# Patient Record
Sex: FEMALE
[De-identification: names, ages, dates, MRNs, and addresses within clinical notes are randomized per-mention and may not be internally consistent; named-entity substitution may affect disease eponyms.]

---

## 2021-05-25 ENCOUNTER — TRANSCRIPTION ENCOUNTER (OUTPATIENT)
Age: 31
End: 2021-05-25

## 2021-08-07 ENCOUNTER — TRANSCRIPTION ENCOUNTER (OUTPATIENT)
Age: 31
End: 2021-08-07

## 2021-12-27 ENCOUNTER — TRANSCRIPTION ENCOUNTER (OUTPATIENT)
Age: 31
End: 2021-12-27

## 2022-01-14 ENCOUNTER — TRANSCRIPTION ENCOUNTER (OUTPATIENT)
Age: 32
End: 2022-01-14

## 2022-03-03 PROBLEM — Z00.00 ENCOUNTER FOR PREVENTIVE HEALTH EXAMINATION: Status: ACTIVE | Noted: 2022-03-03

## 2022-03-08 ENCOUNTER — NON-APPOINTMENT (OUTPATIENT)
Age: 32
End: 2022-03-08

## 2022-03-09 ENCOUNTER — NON-APPOINTMENT (OUTPATIENT)
Age: 32
End: 2022-03-09

## 2022-03-11 ENCOUNTER — LABORATORY RESULT (OUTPATIENT)
Age: 32
End: 2022-03-11

## 2022-03-11 ENCOUNTER — APPOINTMENT (OUTPATIENT)
Dept: RHEUMATOLOGY | Facility: CLINIC | Age: 32
End: 2022-03-11
Payer: COMMERCIAL

## 2022-03-11 VITALS
HEART RATE: 94 BPM | OXYGEN SATURATION: 100 % | DIASTOLIC BLOOD PRESSURE: 83 MMHG | TEMPERATURE: 97.9 F | HEIGHT: 60 IN | SYSTOLIC BLOOD PRESSURE: 122 MMHG | BODY MASS INDEX: 23.58 KG/M2 | WEIGHT: 120.13 LBS

## 2022-03-11 DIAGNOSIS — M25.432 EFFUSION, LEFT WRIST: ICD-10-CM

## 2022-03-11 DIAGNOSIS — Z78.9 OTHER SPECIFIED HEALTH STATUS: ICD-10-CM

## 2022-03-11 DIAGNOSIS — Z82.49 FAMILY HISTORY OF ISCHEMIC HEART DISEASE AND OTHER DISEASES OF THE CIRCULATORY SYSTEM: ICD-10-CM

## 2022-03-11 PROCEDURE — 99204 OFFICE O/P NEW MOD 45 MIN: CPT | Mod: 25

## 2022-03-11 PROCEDURE — 36415 COLL VENOUS BLD VENIPUNCTURE: CPT

## 2022-03-11 RX ORDER — NAPROXEN 500 MG/1
TABLET ORAL
Refills: 0 | Status: ACTIVE | COMMUNITY

## 2022-03-11 RX ORDER — LORATADINE 5 MG/5 ML
SOLUTION, ORAL ORAL
Refills: 0 | Status: ACTIVE | COMMUNITY

## 2022-03-11 RX ORDER — LEVONORGESTREL AND ETHINYL ESTRADIOL 0.1-0.02MG
KIT ORAL
Refills: 0 | Status: ACTIVE | COMMUNITY

## 2022-03-11 RX ORDER — PSEUDOEPHEDRINE HCL 30 MG
TABLET ORAL
Refills: 0 | Status: ACTIVE | COMMUNITY

## 2022-03-13 LAB
CRP SERPL-MCNC: 18 MG/L
ERYTHROCYTE [SEDIMENTATION RATE] IN BLOOD BY WESTERGREN METHOD: 69 MM/HR
RHEUMATOID FACT SER QL: <10 IU/ML

## 2022-03-13 NOTE — HISTORY OF PRESENT ILLNESS
[FreeTextEntry1] : 31 year old woman with history of joint pain\par Joint pains initially started around 17 years of age\par Saw multiple doctors at that time, was unclear if arthritis vs fibromyalgia at that time\par Few years ago, had joint pains, initially diagnosed with fibromyalgia\par \par Tried different medications, gabapentin, SNRIs, NSAIDs\par \par Felt improvement with prednisone and NSAIDs in past\par Diagnosed with seronegative RA, prescribed methotrexate, but did not start at that time given concern about side effects and diagnosis\par Couple of years later, saw another rheumatologist,\par but did not follow up at that time\par \par At this time, for the past few months has been having pain and stiffness\par Feeling pain around joints, hands, hips, and feet\par Fingers will feel swollen, will wake up at night from pain in the legs\par Pain is worse after sitting and in the morning\par \par Usually takes naproxen which helps, about 2-3 per day\par Usually comes in flares, which can last days to weeks\par \par No family history of arthritis \par No personal or family history of psoriasis\par \par Sometimes takes T12 and MVI, no other vitamin or supplement\par \par No surgical history \par \par Medications in past:\par prednisone\par sulindac\par methotrexate\par Savella\par duloxetine\par tizanidine\par naproxen\par opioids\par Only things that helped were NSAIDs and steroids\par \par Last prednisone long time ago, about 7 years ago which helped\par Had steroid injections, fingers which helped but temporarily\par \par Last blood tests in February 2022, reviewed on patient's phone\par Had xray in past which were normal \par Also had left wrist swelling for the pat couple of months with hyperpigmentation over the flexor surface of the wrist, pain with lateral movements, no pain at present with flexion and extension\par No known injury to the wrist, patient is right hand dominant, had xray with PMD (not available to me at this time), unrevealing but ESR and CRP elevated \par \par No rashes\par no recent infections\par No bowel issues, no diarrhea, no preceding infections\par No uveitis

## 2022-03-13 NOTE — ASSESSMENT
[FreeTextEntry1] : 31 year old woman with history of inflammatory arthritis, noted to have left wrist pain and swelling with elevated ESR and CRP.  Patient previously with diagnosis of seronegative RA, prescribed methotrexate but did not start at that time.  At this time, will obtain ultrasound of the left wrist and hand to evaluate for active synovitis, will also repeat ESR and CRP, in addition to ALLY with reflex to serologies if positive, RF, CCP, lyme testing and HLA B27. WIll continue NSAIDs for now, will follow up in two weeks to review results and further management pending results.

## 2022-03-16 ENCOUNTER — TRANSCRIPTION ENCOUNTER (OUTPATIENT)
Age: 32
End: 2022-03-16

## 2022-03-17 ENCOUNTER — RESULT REVIEW (OUTPATIENT)
Age: 32
End: 2022-03-17

## 2022-03-17 ENCOUNTER — OUTPATIENT (OUTPATIENT)
Dept: OUTPATIENT SERVICES | Facility: HOSPITAL | Age: 32
LOS: 1 days | End: 2022-03-17

## 2022-03-17 ENCOUNTER — APPOINTMENT (OUTPATIENT)
Dept: ULTRASOUND IMAGING | Facility: CLINIC | Age: 32
End: 2022-03-17
Payer: COMMERCIAL

## 2022-03-17 PROCEDURE — 76882 US LMTD JT/FCL EVL NVASC XTR: CPT | Mod: 26,LT,76

## 2022-03-25 LAB
ANA PAT FLD IF-IMP: ABNORMAL
ANACR T: ABNORMAL
CCP AB SER IA-ACNC: <8 UNITS
HLA-B27 RELATED AG QL: NEGATIVE
RF+CCP IGG SER-IMP: NEGATIVE

## 2022-04-05 ENCOUNTER — APPOINTMENT (OUTPATIENT)
Dept: RHEUMATOLOGY | Facility: CLINIC | Age: 32
End: 2022-04-05
Payer: COMMERCIAL

## 2022-04-05 VITALS
HEIGHT: 60 IN | HEART RATE: 84 BPM | WEIGHT: 122 LBS | TEMPERATURE: 98.1 F | SYSTOLIC BLOOD PRESSURE: 103 MMHG | BODY MASS INDEX: 23.95 KG/M2 | OXYGEN SATURATION: 97 % | DIASTOLIC BLOOD PRESSURE: 69 MMHG

## 2022-04-05 DIAGNOSIS — M25.532 PAIN IN LEFT WRIST: ICD-10-CM

## 2022-04-05 PROCEDURE — 99214 OFFICE O/P EST MOD 30 MIN: CPT

## 2022-04-05 NOTE — PHYSICAL EXAM
[General Appearance - Alert] : alert [General Appearance - In No Acute Distress] : in no acute distress [General Appearance - Well-Appearing] : healthy appearing [Sclera] : the sclera and conjunctiva were normal [Respiration, Rhythm And Depth] : normal respiratory rhythm and effort [Exaggerated Use Of Accessory Muscles For Inspiration] : no accessory muscle use [Abnormal Walk] : normal gait [] : no rash [Oriented To Time, Place, And Person] : oriented to person, place, and time [Impaired Insight] : insight and judgment were intact [Affect] : the affect was normal [FreeTextEntry1] : left wrist edema with hyperpigmentation improving.  Improved range of motion with decreased tenderness

## 2022-04-05 NOTE — HISTORY OF PRESENT ILLNESS
[FreeTextEntry1] : 31 year old woman with history of joint pain\par Joint pains initially started around 17 years of age\par Saw multiple doctors at that time, was unclear if arthritis vs fibromyalgia at that time\par Few years ago, had joint pains, initially diagnosed with fibromyalgia\par \par Tried different medications, gabapentin, SNRIs, NSAIDs\par \par Felt improvement with prednisone and NSAIDs in past\par Diagnosed with seronegative RA, prescribed methotrexate, but did not start at that time given concern about side effects and diagnosis\par Couple of years later, saw another rheumatologist,\par but did not follow up at that time\par \par At this time, for the past few months has been having pain and stiffness\par Feeling pain around joints, hands, hips, and feet\par Fingers will feel swollen, will wake up at night from pain in the legs\par Pain is worse after sitting and in the morning\par \par Usually takes naproxen which helps, about 2-3 per day\par Usually comes in flares, which can last days to weeks\par \par No family history of arthritis \par No personal or family history of psoriasis\par \par Sometimes takes T12 and MVI, no other vitamin or supplement\par \par No surgical history \par \par Medications in past:\par prednisone\par sulindac\par methotrexate\par Savella\par duloxetine\par tizanidine\par naproxen\par opioids\par Only things that helped were NSAIDs and steroids\par \par Last prednisone long time ago, about 7 years ago which helped\par Had steroid injections, fingers which helped but temporarily\par \par Last blood tests in February 2022, reviewed on patient's phone\par Had xray in past which were normal \par Also had left wrist swelling for the pat couple of months with hyperpigmentation over the flexor surface of the wrist, pain with lateral movements, no pain at present with flexion and extension\par No known injury to the wrist, patient is right hand dominant, had xray with PMD (not available to me at this time), unrevealing but ESR and CRP elevated \par \par No rashes\par no recent infections\par No bowel issues, no diarrhea, no preceding infections\par No uveitis\par \par April 5, 2022\par Patient returns for follow up\par Reviewed lab results, ESR and CRP elevated, with RF and CCP negative\par Reviewed ultrasound results\par Left wrist with some improvement, feeling better, taking naproxen once per day about three days per week at this time\par Discussed treatment options, has not taken plaquenil in past\par Discussed trial of plaquenil\par

## 2022-04-05 NOTE — ASSESSMENT
[FreeTextEntry1] : 31 year old woman with history of inflammatory arthritis, noted to have left wrist pain and swelling with elevated ESR and CRP.  Patient previously with diagnosis of seronegative RA, reviewed of albs with ESR and CRP elevated, ALLY positive with negative serologies and RF, CCP and HLA B27 negative at this time,  Discussed trial of plaquenil, will start 200 mg qday increasing to bid as tolerated.  Discussed need for follow up with ophthalmologist as recommended for retinal monitoring given plaquenil use.  Can continue naproxen as needed for pain and swelling for now as well.  follow up in six weeks or sooner as needed.

## 2022-04-05 NOTE — REVIEW OF SYSTEMS
[Joint Pain] : joint pain [Joint Swelling] : joint swelling [Negative] : Heme/Lymph [FreeTextEntry9] : improving [de-identified] : dry skin patches intermittently, not at present

## 2022-05-17 ENCOUNTER — APPOINTMENT (OUTPATIENT)
Dept: RHEUMATOLOGY | Facility: CLINIC | Age: 32
End: 2022-05-17
Payer: COMMERCIAL

## 2022-05-17 VITALS
HEART RATE: 100 BPM | BODY MASS INDEX: 24.74 KG/M2 | HEIGHT: 60 IN | SYSTOLIC BLOOD PRESSURE: 115 MMHG | WEIGHT: 126 LBS | TEMPERATURE: 98 F | DIASTOLIC BLOOD PRESSURE: 80 MMHG | OXYGEN SATURATION: 98 %

## 2022-05-17 PROCEDURE — 99214 OFFICE O/P EST MOD 30 MIN: CPT | Mod: 25

## 2022-05-17 PROCEDURE — 36415 COLL VENOUS BLD VENIPUNCTURE: CPT

## 2022-05-17 NOTE — PHYSICAL EXAM
[General Appearance - Alert] : alert [General Appearance - In No Acute Distress] : in no acute distress [General Appearance - Well-Appearing] : healthy appearing [Sclera] : the sclera and conjunctiva were normal [Respiration, Rhythm And Depth] : normal respiratory rhythm and effort [Exaggerated Use Of Accessory Muscles For Inspiration] : no accessory muscle use [Edema] : there was no peripheral edema [Abnormal Walk] : normal gait [Nail Clubbing] : no clubbing  or cyanosis of the fingernails [Musculoskeletal - Swelling] : no joint swelling seen [Motor Tone] : muscle strength and tone were normal [] : no rash [Skin Lesions] : no skin lesions [Oriented To Time, Place, And Person] : oriented to person, place, and time [Impaired Insight] : insight and judgment were intact [Affect] : the affect was normal [Mood] : the mood was normal [FreeTextEntry1] : left wrist much improved, no synovitis noted with full ROM.  Discomfort with full flexion of the left 2nd MCP and PIP, no edema or effusion noted, no trigger

## 2022-05-17 NOTE — ASSESSMENT
[FreeTextEntry1] : 31 year old woman with history of inflammatory arthritis, noted to have left wrist pain and swelling with elevated ESR and CRP.  Patient previously with diagnosis of seronegative RA, reviewed of labs with ESR and CRP elevated, ALLY positive with negative serologies and RF, CCP and HLA B27 negative at this time.  Started on plaquenil 200 mg at last visit in April 2022, doing well, although given GI side effects, unable to tolerate bid dosing at this time.  Left wrist much improved, minimal left 2nd MCP and PIP stiffness.  Will continue plaquenil 200 mg qday for now, opthalmology consultation discussed, follow up with ophthalmologist as recommended for retinal monitoring.  Can continue naproxen as needed for pain and swelling for now as well.  Will update labs today in office, follow up in two months or sooner as needed.

## 2022-05-17 NOTE — DATA REVIEWED
[FreeTextEntry1] : cmp norm\par cbc norm\par \par a1c 5.4\par \par esr 76\par  20\par \par \par  US Joint Nonvasc Extremity Limited, Left             Final\par \par No Documents Attached\par \par \par Result Annotated 25Mar2022 09:54AM by MISHA OTT\par \par \par Appt 4/5/2022\par \par \par   EXAM:  US JOINT NONVASC EXT LTD LT\par EXAM:  US JOINT NONVASC EXT LTD LT\par \par PROCEDURE DATE:  03/17/2022\par \par \par \par \par INTERPRETATION:  History: Left wrist/hand swelling\par \par Technique: Ultrasound imaging of the left hand and wrist was performed utilizing grayscale and power Doppler techniques, in the region of the patient's pain.\par \par Comparison: None\par \par Findings:\par \par There is mild tendinosis with tenosynovitis of the flexor carpi radialis. Mild synovitis of the first carpometacarpal joint and radiocarpal interval. Extensor compartments of the wrist appear unremarkable.\par \par Median nerve appears normal.\par \par No evidence of synovitis, tenosynovitis, or joint effusion within the visualized portions of the hand.\par \par Impression:\par \par Mild tendinosis with tenosynovitis of the flexor carpi radialis.\par \par Mild synovitis of the first carpometacarpal joint and radiocarpal interval.\par \par --- End of Report ---\par \par \par \par \par \par \par JANELL CASAREZ M.D., ATTENDING RADIOLOGIST\par This document has been electronically signed. Mar 17 2022  4:41PM\par \par  \par \par  Ordered by: IMSHA OTT       Collected/Examined: 17Mar2022 03:41PM       \par Verified by: MISHA OTT 25Mar2022 09:55AM       \par  Result Communication: No patient communication needed at this time;\par Stage: Final       \par  Performed at: [facility]       Resulted: 17Mar2022 03:39PM       Last Updated: 25Mar2022 09:55AM       Accession: [id number]

## 2022-05-17 NOTE — HISTORY OF PRESENT ILLNESS
[FreeTextEntry1] : 31 year old woman with history of joint pain\par Joint pains initially started around 17 years of age\par Saw multiple doctors at that time, was unclear if arthritis vs fibromyalgia at that time\par Few years ago, had joint pains, initially diagnosed with fibromyalgia\par \par Tried different medications, gabapentin, SNRIs, NSAIDs\par \par Felt improvement with prednisone and NSAIDs in past\par Diagnosed with seronegative RA, prescribed methotrexate, but did not start at that time given concern about side effects and diagnosis\par Couple of years later, saw another rheumatologist,\par but did not follow up at that time\par \par At this time, for the past few months has been having pain and stiffness\par Feeling pain around joints, hands, hips, and feet\par Fingers will feel swollen, will wake up at night from pain in the legs\par Pain is worse after sitting and in the morning\par \par Usually takes naproxen which helps, about 2-3 per day\par Usually comes in flares, which can last days to weeks\par \par No family history of arthritis \par No personal or family history of psoriasis\par \par Sometimes takes T12 and MVI, no other vitamin or supplement\par \par No surgical history \par \par Medications in past:\par prednisone\par sulindac\par methotrexate\par Savella\par duloxetine\par tizanidine\par naproxen\par opioids\par Only things that helped were NSAIDs and steroids\par \par Last prednisone long time ago, about 7 years ago which helped\par Had steroid injections, fingers which helped but temporarily\par \par Last blood tests in February 2022, reviewed on patient's phone\par Had xray in past which were normal \par Also had left wrist swelling for the pat couple of months with hyperpigmentation over the flexor surface of the wrist, pain with lateral movements, no pain at present with flexion and extension\par No known injury to the wrist, patient is right hand dominant, had xray with PMD (not available to me at this time), unrevealing but ESR and CRP elevated \par \par No rashes\par no recent infections\par No bowel issues, no diarrhea, no preceding infections\par No uveitis\par \par April 5, 2022\par Patient returns for follow up\par Reviewed lab results, ESR and CRP elevated, with RF and CCP negative\par Reviewed ultrasound results\par Left wrist with some improvement, feeling better, taking naproxen once per day about three days per week at this time\par Discussed treatment options, has not taken plaquenil in past\par Discussed trial of plaquenil\par \par May 17, 2022\par Patient returns for follow up\par Feels left wrist much improved, left 2nd finger with stiffness in the morning in flexion\par No pain at present\par Taking plaquenil 200 mg once per day due to GI side effects, takes at night, feeling as though getting better\par No NSAIDs for pain\par No other joints involved at this time.

## 2022-05-18 LAB
ALBUMIN SERPL ELPH-MCNC: 4.4 G/DL
ALP BLD-CCNC: 70 U/L
ALT SERPL-CCNC: 9 U/L
ANION GAP SERPL CALC-SCNC: 15 MMOL/L
AST SERPL-CCNC: 16 U/L
BASOPHILS # BLD AUTO: 0.06 K/UL
BASOPHILS NFR BLD AUTO: 0.9 %
BILIRUB SERPL-MCNC: 0.3 MG/DL
BUN SERPL-MCNC: 8 MG/DL
CALCIUM SERPL-MCNC: 9.7 MG/DL
CHLORIDE SERPL-SCNC: 103 MMOL/L
CO2 SERPL-SCNC: 22 MMOL/L
CREAT SERPL-MCNC: 0.63 MG/DL
CRP SERPL-MCNC: 29 MG/L
EGFR: 123 ML/MIN/1.73M2
EOSINOPHIL # BLD AUTO: 0.2 K/UL
EOSINOPHIL NFR BLD AUTO: 3.1 %
ERYTHROCYTE [SEDIMENTATION RATE] IN BLOOD BY WESTERGREN METHOD: 44 MM/HR
GLUCOSE SERPL-MCNC: 77 MG/DL
HCT VFR BLD CALC: 45.4 %
HGB BLD-MCNC: 14.2 G/DL
IMM GRANULOCYTES NFR BLD AUTO: 0.3 %
LYMPHOCYTES # BLD AUTO: 2.07 K/UL
LYMPHOCYTES NFR BLD AUTO: 31.8 %
MAN DIFF?: NORMAL
MCHC RBC-ENTMCNC: 27.8 PG
MCHC RBC-ENTMCNC: 31.3 GM/DL
MCV RBC AUTO: 88.8 FL
MONOCYTES # BLD AUTO: 0.36 K/UL
MONOCYTES NFR BLD AUTO: 5.5 %
NEUTROPHILS # BLD AUTO: 3.8 K/UL
NEUTROPHILS NFR BLD AUTO: 58.4 %
PLATELET # BLD AUTO: 388 K/UL
POTASSIUM SERPL-SCNC: 4.6 MMOL/L
PROT SERPL-MCNC: 8 G/DL
RBC # BLD: 5.11 M/UL
RBC # FLD: 12.8 %
SODIUM SERPL-SCNC: 140 MMOL/L
WBC # FLD AUTO: 6.51 K/UL

## 2022-07-28 ENCOUNTER — APPOINTMENT (OUTPATIENT)
Dept: RHEUMATOLOGY | Facility: CLINIC | Age: 32
End: 2022-07-28

## 2022-07-28 VITALS
BODY MASS INDEX: 24.15 KG/M2 | SYSTOLIC BLOOD PRESSURE: 123 MMHG | OXYGEN SATURATION: 99 % | DIASTOLIC BLOOD PRESSURE: 88 MMHG | HEIGHT: 60 IN | HEART RATE: 92 BPM | WEIGHT: 123 LBS | TEMPERATURE: 97.3 F

## 2022-07-28 DIAGNOSIS — R70.0 ELEVATED ERYTHROCYTE SEDIMENTATION RATE: ICD-10-CM

## 2022-07-28 DIAGNOSIS — R79.82 ELEVATED C-REACTIVE PROTEIN (CRP): ICD-10-CM

## 2022-07-28 PROCEDURE — 36415 COLL VENOUS BLD VENIPUNCTURE: CPT

## 2022-07-28 PROCEDURE — 99214 OFFICE O/P EST MOD 30 MIN: CPT | Mod: 25

## 2022-07-28 NOTE — DATA REVIEWED
[FreeTextEntry1] : March 2022\par Rheumatoid factor negative\par CCP negative\par ALLY 1:160\par SSA SSB negative\par MARLIN negative\par Double-stranded DNA negative \par B 27 negative\par Lyme negative\par ESR 69\par CRP 18\par \par Hydroxychloroquine started April 2022\par Ophthalmology May 2022\par \par Labs May 2022 \par CBC normal\par CMP normal\par CRP 29 (previously 18)\par ESR 44 (previously 69 in March 2022)\par \par \par  US Joint Nonvasc Extremity Limited, Left             Final\par \par No Documents Attached\par \par \par Result Annotated 25Mar2022 09:54AM by MISHA OTT\par \par \par Appt 4/5/2022\par \par \par   EXAM:  US JOINT NONVASC EXT LTD LT\par EXAM:  US JOINT NONVASC EXT LTD LT\par \par PROCEDURE DATE:  03/17/2022\par \par \par \par \par INTERPRETATION:  History: Left wrist/hand swelling\par \par Technique: Ultrasound imaging of the left hand and wrist was performed utilizing grayscale and power Doppler techniques, in the region of the patient's pain.\par \par Comparison: None\par \par Findings:\par \par There is mild tendinosis with tenosynovitis of the flexor carpi radialis. Mild synovitis of the first carpometacarpal joint and radiocarpal interval. Extensor compartments of the wrist appear unremarkable.\par \par Median nerve appears normal.\par \par No evidence of synovitis, tenosynovitis, or joint effusion within the visualized portions of the hand.\par \par Impression:\par \par Mild tendinosis with tenosynovitis of the flexor carpi radialis.\par \par Mild synovitis of the first carpometacarpal joint and radiocarpal interval.\par \par --- End of Report ---\par \par \par \par \par \par \par JANELL CASAREZ M.D., ATTENDING RADIOLOGIST\par This document has been electronically signed. Mar 17 2022  4:41PM\par \par  \par \par  Ordered by: MISHA OTT       Collected/Examined: 17Mar2022 03:41PM       \par Verified by: MISHA OTT 25Mar2022 09:55AM       \par  Result Communication: No patient communication needed at this time;\par Stage: Final       \par  Performed at: [facility]       Resulted: 17Mar2022 03:39PM       Last Updated: 25Mar2022 09:55AM       Accession: [id number]

## 2022-07-28 NOTE — ASSESSMENT
[FreeTextEntry1] : 31 year old woman with history of inflammatory polyarthritis, noted to have left wrist pain and swelling with elevated ESR and CRP.  Patient previously with diagnosis of seronegative RA, reviewed of labs with ESR and CRP elevated, ALLY positive with negative serologies and RF, CCP and HLA B27 negative at this time.  Started on plaquenil 200 mg in April 2022, doing well, although given previous GI side effects, unable to tolerate bid dosing.  Joint symptoms continue to improve with minimal morning stiffness of the hands less than 15 minutes.   Will continue plaquenil 200 mg qday but given improvement in GI symptoms we will try to increase Plaquenil to 400 mg/day alternating with 200 mg/day for 2 to 3 months. Opthalmology consultation up-to-date last visit May 2022, continue to follow up with ophthalmologist as recommended for retinal monitoring.  Can continue naproxen as needed for pain and swelling for now as well.  Will update labs today in office, follow up in three months or sooner as needed.

## 2022-07-28 NOTE — HISTORY OF PRESENT ILLNESS
[FreeTextEntry1] : 31 year old woman with history of joint pain\par Joint pains initially started around 17 years of age\par Saw multiple doctors at that time, was unclear if arthritis vs fibromyalgia at that time\par Few years ago, had joint pains, initially diagnosed with fibromyalgia\par \par Tried different medications, gabapentin, SNRIs, NSAIDs\par \par Felt improvement with prednisone and NSAIDs in past\par Diagnosed with seronegative RA, prescribed methotrexate, but did not start at that time given concern about side effects and diagnosis\par Couple of years later, saw another rheumatologist,\par but did not follow up at that time\par \par At this time, for the past few months has been having pain and stiffness\par Feeling pain around joints, hands, hips, and feet\par Fingers will feel swollen, will wake up at night from pain in the legs\par Pain is worse after sitting and in the morning\par \par Usually takes naproxen which helps, about 2-3 per day\par Usually comes in flares, which can last days to weeks\par \par No family history of arthritis \par No personal or family history of psoriasis\par \par Sometimes takes T12 and MVI, no other vitamin or supplement\par \par No surgical history \par \par Medications in past:\par prednisone\par sulindac\par methotrexate\par Savella\par duloxetine\par tizanidine\par naproxen\par opioids\par Only things that helped were NSAIDs and steroids\par \par Last prednisone long time ago, about 7 years ago which helped\par Had steroid injections, fingers which helped but temporarily\par \par Last blood tests in February 2022, reviewed on patient's phone\par Had xray in past which were normal \par Also had left wrist swelling for the past couple of months with hyperpigmentation over the flexor surface of the wrist, pain with lateral movements, no pain at present with flexion and extension\par No known injury to the wrist, patient is right hand dominant, had xray with PMD (not available to me at this time), unrevealing but ESR and CRP elevated \par \par No rashes\par no recent infections\par No bowel issues, no diarrhea, no preceding infections\par No uveitis\par \par April 5, 2022\par Patient returns for follow up\par Reviewed lab results, ESR and CRP elevated, with RF and CCP negative\par Reviewed ultrasound results\par Left wrist with some improvement, feeling better, taking naproxen once per day about three days per week at this time\par Discussed treatment options, has not taken plaquenil in past\par Discussed trial of plaquenil\par \par May 17, 2022\par Patient returns for follow up\par Feels left wrist much improved, left 2nd finger with stiffness in the morning in flexion\par No pain at present\par Taking plaquenil 200 mg once per day due to GI side effects, takes at night, feeling as though getting better\par No NSAIDs for pain\par No other joints involved at this time.\par \par July 28, 2022\par Patient returns for follow up\par Patient feeling better, still with some stiffness in the morning about 10 to 15 minutes of the hands\par Left wrist much improved currently asymptomatic\par Reviewed pictures on phone from late June with swelling of the left second finger, improves as day progresses\par Able to tolerate Plaquenil 200 mg once per day, no GI symptoms today\par Ophthalmology follow-up May 26, 2022, recommended follow-up in 1 year\par No involvement of the lower extremities\par

## 2022-07-28 NOTE — PHYSICAL EXAM
[General Appearance - Alert] : alert [General Appearance - In No Acute Distress] : in no acute distress [General Appearance - Well-Appearing] : healthy appearing [Sclera] : the sclera and conjunctiva were normal [Respiration, Rhythm And Depth] : normal respiratory rhythm and effort [Edema] : there was no peripheral edema [Exaggerated Use Of Accessory Muscles For Inspiration] : no accessory muscle use [Abnormal Walk] : normal gait [Nail Clubbing] : no clubbing  or cyanosis of the fingernails [Musculoskeletal - Swelling] : no joint swelling seen [Motor Tone] : muscle strength and tone were normal [Skin Lesions] : no skin lesions [] : no rash [Oriented To Time, Place, And Person] : oriented to person, place, and time [Impaired Insight] : insight and judgment were intact [Affect] : the affect was normal [Mood] : the mood was normal [FreeTextEntry1] : No active synovitis, good handgrip strength bilaterally, improvement of flexion of the left second MCP and PIP

## 2022-07-29 LAB
ALBUMIN SERPL ELPH-MCNC: 4.6 G/DL
ALP BLD-CCNC: 64 U/L
ALT SERPL-CCNC: 8 U/L
ANION GAP SERPL CALC-SCNC: 12 MMOL/L
AST SERPL-CCNC: 14 U/L
BASOPHILS # BLD AUTO: 0.03 K/UL
BASOPHILS NFR BLD AUTO: 0.5 %
BILIRUB SERPL-MCNC: 0.5 MG/DL
BUN SERPL-MCNC: 9 MG/DL
CALCIUM SERPL-MCNC: 10 MG/DL
CHLORIDE SERPL-SCNC: 102 MMOL/L
CO2 SERPL-SCNC: 23 MMOL/L
CREAT SERPL-MCNC: 0.69 MG/DL
CRP SERPL-MCNC: 16 MG/L
EGFR: 120 ML/MIN/1.73M2
EOSINOPHIL # BLD AUTO: 0.12 K/UL
EOSINOPHIL NFR BLD AUTO: 2 %
ERYTHROCYTE [SEDIMENTATION RATE] IN BLOOD BY WESTERGREN METHOD: 48 MM/HR
GLUCOSE SERPL-MCNC: 76 MG/DL
HCT VFR BLD CALC: 42.6 %
HGB BLD-MCNC: 13.8 G/DL
IMM GRANULOCYTES NFR BLD AUTO: 0.3 %
LYMPHOCYTES # BLD AUTO: 1.66 K/UL
LYMPHOCYTES NFR BLD AUTO: 27.1 %
MAN DIFF?: NORMAL
MCHC RBC-ENTMCNC: 28.1 PG
MCHC RBC-ENTMCNC: 32.4 GM/DL
MCV RBC AUTO: 86.8 FL
MONOCYTES # BLD AUTO: 0.41 K/UL
MONOCYTES NFR BLD AUTO: 6.7 %
NEUTROPHILS # BLD AUTO: 3.88 K/UL
NEUTROPHILS NFR BLD AUTO: 63.4 %
PLATELET # BLD AUTO: 280 K/UL
POTASSIUM SERPL-SCNC: 4.6 MMOL/L
PROT SERPL-MCNC: 8 G/DL
RBC # BLD: 4.91 M/UL
RBC # FLD: 12.5 %
SODIUM SERPL-SCNC: 137 MMOL/L
WBC # FLD AUTO: 6.12 K/UL

## 2022-09-08 ENCOUNTER — APPOINTMENT (OUTPATIENT)
Dept: RHEUMATOLOGY | Facility: CLINIC | Age: 32
End: 2022-09-08

## 2022-09-08 VITALS
SYSTOLIC BLOOD PRESSURE: 114 MMHG | DIASTOLIC BLOOD PRESSURE: 77 MMHG | HEIGHT: 60 IN | TEMPERATURE: 97.3 F | WEIGHT: 117 LBS | BODY MASS INDEX: 22.97 KG/M2 | HEART RATE: 96 BPM | OXYGEN SATURATION: 98 %

## 2022-09-08 PROCEDURE — 99214 OFFICE O/P EST MOD 30 MIN: CPT

## 2022-09-08 NOTE — PHYSICAL EXAM
[General Appearance - Alert] : alert [General Appearance - In No Acute Distress] : in no acute distress [General Appearance - Well-Appearing] : healthy appearing [Sclera] : the sclera and conjunctiva were normal [Respiration, Rhythm And Depth] : normal respiratory rhythm and effort [Exaggerated Use Of Accessory Muscles For Inspiration] : no accessory muscle use [Edema] : there was no peripheral edema [Abnormal Walk] : normal gait [Nail Clubbing] : no clubbing  or cyanosis of the fingernails [Motor Tone] : muscle strength and tone were normal [] : no rash [Skin Lesions] : no skin lesions [Oriented To Time, Place, And Person] : oriented to person, place, and time [Impaired Insight] : insight and judgment were intact [Affect] : the affect was normal [Mood] : the mood was normal [FreeTextEntry1] : Minimal tenderness over the right CMC of the first digit of the right hand.  Minimal discomfort with flexion of the left second PIP.  No MCP synovitis noted.  No wrist synovitis noted.  Good range of motion of the elbows shoulders.  No involvement of the lower extremity at this time

## 2022-09-08 NOTE — HISTORY OF PRESENT ILLNESS
[FreeTextEntry1] : March 11, 2022\par New patient visit \par 31 year old woman with history of joint pain\par Joint pains initially started around 17 years of age\par Saw multiple doctors at that time, was unclear if arthritis vs fibromyalgia at that time\par Few years ago, had joint pains, initially diagnosed with fibromyalgia\par \par Tried different medications, gabapentin, SNRIs, NSAIDs\par \par Felt improvement with prednisone and NSAIDs in past\par Diagnosed with seronegative RA, prescribed methotrexate, but did not start at that time given concern about side effects and diagnosis\par Couple of years later, saw another rheumatologist,\par but did not follow up at that time\par \par At this time, for the past few months has been having pain and stiffness\par Feeling pain around joints, hands, hips, and feet\par Fingers will feel swollen, will wake up at night from pain in the legs\par Pain is worse after sitting and in the morning\par \par Usually takes naproxen which helps, about 2-3 per day\par Usually comes in flares, which can last days to weeks\par \par No family history of arthritis \par No personal or family history of psoriasis\par \par Sometimes takes T12 and MVI, no other vitamin or supplement\par \par No surgical history \par \par Medications in past:\par prednisone\par sulindac\par methotrexate\par Savella\par duloxetine\par tizanidine\par naproxen\par opioids\par Only things that helped were NSAIDs and steroids\par \par Last prednisone long time ago, about 7 years ago which helped\par Had steroid injections, fingers which helped but temporarily\par \par Last blood tests in February 2022, reviewed on patient's phone\par Had xray in past which were normal \par Also had left wrist swelling for the past couple of months with hyperpigmentation over the flexor surface of the wrist, pain with lateral movements, no pain at present with flexion and extension\par No known injury to the wrist, patient is right hand dominant, had xray with PMD (not available to me at this time), unrevealing but ESR and CRP elevated \par \par No rashes\par no recent infections\par No bowel issues, no diarrhea, no preceding infections\par No uveitis\par \par April 5, 2022\par Patient returns for follow up\par Reviewed lab results, ESR and CRP elevated, with RF and CCP negative\par Reviewed ultrasound results\par Left wrist with some improvement, feeling better, taking naproxen once per day about three days per week at this time\par Discussed treatment options, has not taken plaquenil in past\par Discussed trial of plaquenil\par \par May 17, 2022\par Patient returns for follow up\par Feels left wrist much improved, left 2nd finger with stiffness in the morning in flexion\par No pain at present\par Taking plaquenil 200 mg once per day due to GI side effects, takes at night, feeling as though getting better\par No NSAIDs for pain\par No other joints involved at this time.\par \par July 28, 2022\par Patient returns for follow up\par Patient feeling better, still with some stiffness in the morning about 10 to 15 minutes of the hands\par Left wrist much improved currently asymptomatic\par Reviewed pictures on phone from late June with swelling of the left second finger, improves as day progresses\par Able to tolerate Plaquenil 200 mg once per day, no GI symptoms today\par Ophthalmology follow-up May 26, 2022, recommended follow-up in 1 year\par No involvement of the lower extremities\par \par September 8, 2022\par Patient returns for follow-up\par Feeling well today, taking hydroxychloroquine 200 mg once per day\par Had COVID infection in early August 2022, was symptomatic for about 2 weeks, did not take hydroxychloroquine at that time\par Since restarting about 3 weeks ago, feels improvement in joint stiffness and pain\par Has pain in the right CMC of thumb, as well as left second finger\par No other joints involved at this time\par No side effects noted from hydroxychloroquine, although hesitant to increase to twice daily dosing\par No episodes of swelling of joints since last visit\par Some weight loss, intentional.\par Trying to increase exercise as well\par Ophthalmology up-to-date, last visit May 2022.\par No recent naproxen use, last used during COVID infection

## 2022-09-08 NOTE — ASSESSMENT
[FreeTextEntry1] : 32 year old woman with history of inflammatory polyarthritis, noted to have left wrist pain and swelling with elevated ESR and CRP.  Patient previously with diagnosis of seronegative RA, review of labs with ESR and CRP elevated, ALLY positive with negative serologies and RF, CCP and HLA B27 negative  Started on plaquenil 200 mg in April 2022, doing well, although given previous GI side effects, unable to tolerate bid dosing.  Joint symptoms continue to improve with minimal morning stiffness of the hands less than 15 minutes.  Patient recently restarted hydroxychloroquine about 3 weeks ago as did not take medication during recent COVID infection in early August 2022, now feeling improvement in joint symptoms.  Given recent COVID infection and recent restart of hydroxychloroquine, will repeat labs at next visit to reassess inflammatory markers in 2 months. Opthalmology consultation up-to-date last visit May 2022, continue to follow up with ophthalmologist as recommended for retinal monitoring.  Can continue naproxen as needed for pain and swelling for now as well.  Follow up in two months or sooner as needed.

## 2022-09-08 NOTE — REVIEW OF SYSTEMS
[Joint Stiffness] : joint stiffness [Negative] : Heme/Lymph [FreeTextEntry9] : right thumb and left second finger

## 2022-09-08 NOTE — DATA REVIEWED
[FreeTextEntry1] : March 2022\par Rheumatoid factor negative\par CCP negative\par ALLY 1:160\par SSA SSB negative\par MARLIN negative\par Double-stranded DNA negative \par B 27 negative\par Lyme negative\par ESR 69\par CRP 18\par \par Hydroxychloroquine started April 2022\par Ophthalmology May 2022\par \par Labs May 2022 \par CBC normal\par CMP normal\par CRP 29 (previously 18)\par ESR 44 (previously 69 in March 2022)\par \par \par  US Joint Nonvasc Extremity Limited, Left             Final\par \par No Documents Attached\par \par \par Result Annotated 25Mar2022 09:54AM by MISHA OTT\par \par \par Appt 4/5/2022\par \par \par   EXAM:  US JOINT NONVASC EXT LTD LT\par EXAM:  US JOINT NONVASC EXT LTD LT\par \par PROCEDURE DATE:  03/17/2022\par \par \par \par \par INTERPRETATION:  History: Left wrist/hand swelling\par \par Technique: Ultrasound imaging of the left hand and wrist was performed utilizing grayscale and power Doppler techniques, in the region of the patient's pain.\par \par Comparison: None\par \par Findings:\par \par There is mild tendinosis with tenosynovitis of the flexor carpi radialis. Mild synovitis of the first carpometacarpal joint and radiocarpal interval. Extensor compartments of the wrist appear unremarkable.\par \par Median nerve appears normal.\par \par No evidence of synovitis, tenosynovitis, or joint effusion within the visualized portions of the hand.\par \par Impression:\par \par Mild tendinosis with tenosynovitis of the flexor carpi radialis.\par \par Mild synovitis of the first carpometacarpal joint and radiocarpal interval.\par \par --- End of Report ---\par \par \par \par \par \par \par JANELL CASAREZ M.D., ATTENDING RADIOLOGIST\par This document has been electronically signed. Mar 17 2022  4:41PM\par \par  \par \par  Ordered by: MISHA OTT       Collected/Examined: 17Mar2022 03:41PM       \par Verified by: MISHA OTT 25Mar2022 09:55AM       \par  Result Communication: No patient communication needed at this time;\par Stage: Final       \par  Performed at: [facility]       Resulted: 17Mar2022 03:39PM       Last Updated: 25Mar2022 09:55AM       Accession: [id number]

## 2022-11-10 ENCOUNTER — APPOINTMENT (OUTPATIENT)
Dept: RHEUMATOLOGY | Facility: CLINIC | Age: 32
End: 2022-11-10

## 2022-11-10 VITALS
HEIGHT: 60 IN | SYSTOLIC BLOOD PRESSURE: 91 MMHG | TEMPERATURE: 98.2 F | WEIGHT: 115 LBS | OXYGEN SATURATION: 99 % | HEART RATE: 90 BPM | BODY MASS INDEX: 22.58 KG/M2 | DIASTOLIC BLOOD PRESSURE: 61 MMHG

## 2022-11-10 DIAGNOSIS — M06.4 INFLAMMATORY POLYARTHROPATHY: ICD-10-CM

## 2022-11-10 DIAGNOSIS — M25.50 PAIN IN UNSPECIFIED JOINT: ICD-10-CM

## 2022-11-10 DIAGNOSIS — M25.659 STIFFNESS OF UNSPECIFIED HIP, NOT ELSEWHERE CLASSIFIED: ICD-10-CM

## 2022-11-10 PROCEDURE — 36415 COLL VENOUS BLD VENIPUNCTURE: CPT

## 2022-11-10 PROCEDURE — 99214 OFFICE O/P EST MOD 30 MIN: CPT | Mod: 25

## 2022-11-10 RX ORDER — HYDROXYCHLOROQUINE SULFATE 200 MG/1
200 TABLET, FILM COATED ORAL TWICE DAILY
Qty: 80 | Refills: 1 | Status: ACTIVE | OUTPATIENT
Start: 2022-04-05

## 2022-11-10 NOTE — PHYSICAL EXAM
[General Appearance - Alert] : alert [General Appearance - In No Acute Distress] : in no acute distress [General Appearance - Well-Appearing] : healthy appearing [Sclera] : the sclera and conjunctiva were normal [Respiration, Rhythm And Depth] : normal respiratory rhythm and effort [Exaggerated Use Of Accessory Muscles For Inspiration] : no accessory muscle use [Edema] : there was no peripheral edema [No Spinal Tenderness] : no spinal tenderness [Abnormal Walk] : normal gait [Nail Clubbing] : no clubbing  or cyanosis of the fingernails [Musculoskeletal - Swelling] : no joint swelling seen [Motor Tone] : muscle strength and tone were normal [] : no rash [Skin Lesions] : no skin lesions [Oriented To Time, Place, And Person] : oriented to person, place, and time [Impaired Insight] : insight and judgment were intact [Affect] : the affect was normal [Mood] : the mood was normal [FreeTextEntry1] : Decreased external rotation of the left hip more than right hip

## 2022-11-10 NOTE — HISTORY OF PRESENT ILLNESS
[FreeTextEntry1] : March 11, 2022\par New patient visit \par 31 year old woman with history of joint pain\par Joint pains initially started around 17 years of age\par Saw multiple doctors at that time, was unclear if arthritis vs fibromyalgia at that time\par Few years ago, had joint pains, initially diagnosed with fibromyalgia\par \par Tried different medications, gabapentin, SNRIs, NSAIDs\par \par Felt improvement with prednisone and NSAIDs in past\par Diagnosed with seronegative RA, prescribed methotrexate, but did not start at that time given concern about side effects and diagnosis\par Couple of years later, saw another rheumatologist,\par but did not follow up at that time\par \par At this time, for the past few months has been having pain and stiffness\par Feeling pain around joints, hands, hips, and feet\par Fingers will feel swollen, will wake up at night from pain in the legs\par Pain is worse after sitting and in the morning\par \par Usually takes naproxen which helps, about 2-3 per day\par Usually comes in flares, which can last days to weeks\par \par No family history of arthritis \par No personal or family history of psoriasis\par \par Sometimes takes T12 and MVI, no other vitamin or supplement\par \par No surgical history \par \par Medications in past:\par prednisone\par sulindac\par methotrexate\par Savella\par duloxetine\par tizanidine\par naproxen\par opioids\par Only things that helped were NSAIDs and steroids\par \par Last prednisone long time ago, about 7 years ago which helped\par Had steroid injections, fingers which helped but temporarily\par \par Last blood tests in February 2022, reviewed on patient's phone\par Had xray in past which were normal \par Also had left wrist swelling for the past couple of months with hyperpigmentation over the flexor surface of the wrist, pain with lateral movements, no pain at present with flexion and extension\par No known injury to the wrist, patient is right hand dominant, had xray with PMD (not available to me at this time), unrevealing but ESR and CRP elevated \par \par No rashes\par no recent infections\par No bowel issues, no diarrhea, no preceding infections\par No uveitis\par \par April 5, 2022\par Patient returns for follow up\par Reviewed lab results, ESR and CRP elevated, with RF and CCP negative\par Reviewed ultrasound results\par Left wrist with some improvement, feeling better, taking naproxen once per day about three days per week at this time\par Discussed treatment options, has not taken plaquenil in past\par Discussed trial of plaquenil\par \par May 17, 2022\par Patient returns for follow up\par Feels left wrist much improved, left 2nd finger with stiffness in the morning in flexion\par No pain at present\par Taking plaquenil 200 mg once per day due to GI side effects, takes at night, feeling as though getting better\par No NSAIDs for pain\par No other joints involved at this time.\par \par July 28, 2022\par Patient returns for follow up\par Patient feeling better, still with some stiffness in the morning about 10 to 15 minutes of the hands\par Left wrist much improved currently asymptomatic\par Reviewed pictures on phone from late June with swelling of the left second finger, improves as day progresses\par Able to tolerate Plaquenil 200 mg once per day, no GI symptoms today\par Ophthalmology follow-up May 26, 2022, recommended follow-up in 1 year\par No involvement of the lower extremities\par \par September 8, 2022\par Patient returns for follow-up\par Feeling well today, taking hydroxychloroquine 200 mg once per day\par Had COVID infection in early August 2022, was symptomatic for about 2 weeks, did not take hydroxychloroquine at that time\par Since restarting about 3 weeks ago, feels improvement in joint stiffness and pain\par Has pain in the right CMC of thumb, as well as left second finger\par No other joints involved at this time\par No side effects noted from hydroxychloroquine, although hesitant to increase to twice daily dosing\par No episodes of swelling of joints since last visit\par Some weight loss, intentional.\par Trying to increase exercise as well\par Ophthalmology up-to-date, last visit May 2022.\par No recent naproxen use, last used during COVID infection\par \par November 10, 2022\par Patient returns for follow up\par Patient  feeling well\par Has episodes of joint symptoms, usually occurs with increased activity, last time about one month ago\par Usually hips and ankles\par Taking plaquenil 200 mg qday\par No pain at present\par Took NSAIDs after walking a lot last weekend, no pain when woke up in the morning.\par No new medications\par No recent infections since covid, had flu vaccine\par Does not exercise regularly but walks often \par No vitamins or supplements at  this time

## 2022-11-10 NOTE — ASSESSMENT
[FreeTextEntry1] : 32 year old woman with history of inflammatory polyarthritis, noted to have left wrist pain and swelling with elevated ESR and CRP.  Patient previously with diagnosis of seronegative RA, review of labs with ESR and CRP elevated, ALLY positive with negative serologies and RF, CCP and HLA B27 negative  Started on plaquenil 200 mg in April 2022, doing well, although given previous GI side effects, unable to tolerate bid dosing.  Joint symptoms continue to improve with minimal morning stiffness of the hands less than 15 minutes.  Patient reports hip stiffness and ankle pain which is episodic, feels may be triggered by increased activities the day before, now with some decreased external rotation of the left hip more than the right hip, will obtain x-rays of the pelvis today for further evaluation, LMP at present.  Patient recently continues hydroxychloroquine 200 mg daily, with improvement in joint symptoms.  Flu vaccine obtained previously.  Recent COVID infection in August 2022.  Opthalmology consultation up-to-date last visit May 2022, continue to follow up with ophthalmologist as recommended for retinal monitoring.  Can continue naproxen as needed for pain and swelling for now as well.  Will update labs today in office including CBC, CMP, ESR, and CRP.  Follow up in three months or sooner as needed.

## 2022-11-10 NOTE — DATA REVIEWED
[FreeTextEntry1] : March 2022\par Rheumatoid factor negative\par CCP negative\par ALLY 1:160\par SSA SSB negative\par MARLIN negative\par Double-stranded DNA negative \par B 27 negative\par Lyme negative\par ESR 69\par CRP 18\par \par Hydroxychloroquine started April 2022\par Ophthalmology May 2022\par \par Labs May 2022 \par CBC normal\par CMP normal\par CRP 29 (previously 18)\par ESR 44 (previously 69 in March 2022)\par \par \par  US Joint Nonvasc Extremity Limited, Left             Final\par \par No Documents Attached\par \par \par Result Annotated 25Mar2022 09:54AM by MISHA OTT\par \par \par Appt 4/5/2022\par \par \par   EXAM:  US JOINT NONVASC EXT LTD LT\par EXAM:  US JOINT NONVASC EXT LTD LT\par \par PROCEDURE DATE:  03/17/2022\par \par \par \par \par INTERPRETATION:  History: Left wrist/hand swelling\par \par Technique: Ultrasound imaging of the left hand and wrist was performed utilizing grayscale and power Doppler techniques, in the region of the patient's pain.\par \par Comparison: None\par \par Findings:\par \par There is mild tendinosis with tenosynovitis of the flexor carpi radialis. Mild synovitis of the first carpometacarpal joint and radiocarpal interval. Extensor compartments of the wrist appear unremarkable.\par \par Median nerve appears normal.\par \par No evidence of synovitis, tenosynovitis, or joint effusion within the visualized portions of the hand.\par \par Impression:\par \par Mild tendinosis with tenosynovitis of the flexor carpi radialis.\par \par Mild synovitis of the first carpometacarpal joint and radiocarpal interval.\par \par --- End of Report ---\par \par \par \par \par \par \par JANELL CASAREZ M.D., ATTENDING RADIOLOGIST\par This document has been electronically signed. Mar 17 2022  4:41PM\par \par  \par \par  Ordered by: MISHA OTT       Collected/Examined: 17Mar2022 03:41PM       \par Verified by: MISHA OTT 25Mar2022 09:55AM       \par  Result Communication: No patient communication needed at this time;\par Stage: Final       \par  Performed at: [facility]       Resulted: 17Mar2022 03:39PM       Last Updated: 25Mar2022 09:55AM       Accession: [id number]

## 2022-11-11 ENCOUNTER — OUTPATIENT (OUTPATIENT)
Dept: OUTPATIENT SERVICES | Facility: HOSPITAL | Age: 32
LOS: 1 days | End: 2022-11-11

## 2022-11-11 ENCOUNTER — RESULT REVIEW (OUTPATIENT)
Age: 32
End: 2022-11-11

## 2022-11-11 ENCOUNTER — APPOINTMENT (OUTPATIENT)
Dept: RADIOLOGY | Facility: CLINIC | Age: 32
End: 2022-11-11

## 2022-11-11 LAB
ALBUMIN SERPL ELPH-MCNC: 4.3 G/DL
ALP BLD-CCNC: 70 U/L
ALT SERPL-CCNC: 8 U/L
ANION GAP SERPL CALC-SCNC: 11 MMOL/L
AST SERPL-CCNC: 14 U/L
BASOPHILS # BLD AUTO: 0.04 K/UL
BASOPHILS NFR BLD AUTO: 0.8 %
BILIRUB SERPL-MCNC: 0.4 MG/DL
BUN SERPL-MCNC: 8 MG/DL
CALCIUM SERPL-MCNC: 9.6 MG/DL
CHLORIDE SERPL-SCNC: 104 MMOL/L
CO2 SERPL-SCNC: 23 MMOL/L
CREAT SERPL-MCNC: 0.58 MG/DL
CRP SERPL-MCNC: 10 MG/L
EGFR: 125 ML/MIN/1.73M2
EOSINOPHIL # BLD AUTO: 0.22 K/UL
EOSINOPHIL NFR BLD AUTO: 4.4 %
ERYTHROCYTE [SEDIMENTATION RATE] IN BLOOD BY WESTERGREN METHOD: 67 MM/HR
GLUCOSE SERPL-MCNC: 83 MG/DL
HCT VFR BLD CALC: 39.6 %
HGB BLD-MCNC: 13.2 G/DL
IMM GRANULOCYTES NFR BLD AUTO: 0.2 %
LYMPHOCYTES # BLD AUTO: 1.91 K/UL
LYMPHOCYTES NFR BLD AUTO: 38.2 %
MAN DIFF?: NORMAL
MCHC RBC-ENTMCNC: 28.4 PG
MCHC RBC-ENTMCNC: 33.3 GM/DL
MCV RBC AUTO: 85.2 FL
MONOCYTES # BLD AUTO: 0.27 K/UL
MONOCYTES NFR BLD AUTO: 5.4 %
NEUTROPHILS # BLD AUTO: 2.55 K/UL
NEUTROPHILS NFR BLD AUTO: 51 %
PLATELET # BLD AUTO: 311 K/UL
POTASSIUM SERPL-SCNC: 4.4 MMOL/L
PROT SERPL-MCNC: 7.3 G/DL
RBC # BLD: 4.65 M/UL
RBC # FLD: 12.7 %
SODIUM SERPL-SCNC: 138 MMOL/L
WBC # FLD AUTO: 5 K/UL

## 2022-11-11 PROCEDURE — 72170 X-RAY EXAM OF PELVIS: CPT | Mod: 26

## 2023-03-29 ENCOUNTER — OUTPATIENT (OUTPATIENT)
Dept: OUTPATIENT SERVICES | Facility: HOSPITAL | Age: 33
LOS: 1 days | End: 2023-03-29

## 2023-03-29 ENCOUNTER — APPOINTMENT (OUTPATIENT)
Dept: ULTRASOUND IMAGING | Facility: CLINIC | Age: 33
End: 2023-03-29
Payer: COMMERCIAL

## 2023-03-29 PROCEDURE — 93971 EXTREMITY STUDY: CPT | Mod: 26,LT
